# Patient Record
Sex: FEMALE
[De-identification: names, ages, dates, MRNs, and addresses within clinical notes are randomized per-mention and may not be internally consistent; named-entity substitution may affect disease eponyms.]

---

## 2018-05-05 ENCOUNTER — HOSPITAL ENCOUNTER (EMERGENCY)
Dept: HOSPITAL 97 - ER | Age: 23
Discharge: HOME | End: 2018-05-05
Payer: SELF-PAY

## 2018-05-05 DIAGNOSIS — O03.9: Primary | ICD-10-CM

## 2018-05-05 LAB
BLD SMEAR INTERP: (no result)
BUN BLD-MCNC: 9 MG/DL (ref 6–20)
GLUCOSE SERPLBLD-MCNC: 100 MG/DL (ref 65–120)
HCG SERPL-ACNC: 1188.7 MIU/ML (ref ?–5)
HCT VFR BLD CALC: 32.7 % (ref 36–45)
LYMPHOCYTES # SPEC AUTO: 2 K/UL (ref 0.7–4.9)
MCH RBC QN AUTO: 28 PG (ref 27–35)
MCV RBC: 81.5 FL (ref 80–100)
MORPHOLOGY BLD-IMP: (no result)
PMV BLD: 8.2 FL (ref 7.6–11.3)
POTASSIUM SERPL-SCNC: 3.8 MEQ/L (ref 3.6–5)
RBC # BLD: 4.02 M/UL (ref 3.86–4.86)
UA COMPLETE W REFLEX CULTURE PNL UR: (no result)

## 2018-05-05 PROCEDURE — 87086 URINE CULTURE/COLONY COUNT: CPT

## 2018-05-05 PROCEDURE — 81015 MICROSCOPIC EXAM OF URINE: CPT

## 2018-05-05 PROCEDURE — 81025 URINE PREGNANCY TEST: CPT

## 2018-05-05 PROCEDURE — 96361 HYDRATE IV INFUSION ADD-ON: CPT

## 2018-05-05 PROCEDURE — 99284 EMERGENCY DEPT VISIT MOD MDM: CPT

## 2018-05-05 PROCEDURE — 86901 BLOOD TYPING SEROLOGIC RH(D): CPT

## 2018-05-05 PROCEDURE — 36415 COLL VENOUS BLD VENIPUNCTURE: CPT

## 2018-05-05 PROCEDURE — 84702 CHORIONIC GONADOTROPIN TEST: CPT

## 2018-05-05 PROCEDURE — 96372 THER/PROPH/DIAG INJ SC/IM: CPT

## 2018-05-05 PROCEDURE — 80048 BASIC METABOLIC PNL TOTAL CA: CPT

## 2018-05-05 PROCEDURE — 86900 BLOOD TYPING SEROLOGIC ABO: CPT

## 2018-05-05 PROCEDURE — 85025 COMPLETE CBC W/AUTO DIFF WBC: CPT

## 2018-05-05 PROCEDURE — 76817 TRANSVAGINAL US OBSTETRIC: CPT

## 2018-05-05 PROCEDURE — 96374 THER/PROPH/DIAG INJ IV PUSH: CPT

## 2018-05-05 PROCEDURE — 87088 URINE BACTERIA CULTURE: CPT

## 2018-05-05 PROCEDURE — 81003 URINALYSIS AUTO W/O SCOPE: CPT

## 2018-05-05 NOTE — ER
Nurse's Notes                                                                                     

 [facility]                                                                

Name: Georgia Brothers                                                                            

Age: 23 yrs                                                                                       

Sex: Female                                                                                       

: 1995                                                                                   

MRN: [id number]                                                                                   

Arrival Date: 2018                                                                          

Time: 18:49                                                                                       

Account#: [id number]                                                                            

Bed 23                                                                                            

Private MD:                                                                                       

Diagnosis: Spontaneous                                                                    

                                                                                                  

Presentation:                                                                                     

                                                                                             

19:03 Presenting complaint: Patient states: vaginal bleeding started today. pt seen by [facility]    [facility] 

      Ob/GYN yesterday confirming miscarriage. LMP 2018. Transition of care: patient was       

      not received from another setting of care. Onset of symptoms was May 05, 2018. Initial      

      Sepsis Screen: Does the patient meet any 2 criteria? No. Patient's initial sepsis           

      screen is negative. Does the patient have a suspected source of infection? No.              

      Patient's initial sepsis screen is negative. Care prior to arrival: None.                   

19:03 Method Of Arrival: Ambulatory                                                           [facility] 

19:03 Acuity: EMMY 4                                                                           [facility] 

                                                                                                  

Triage Assessment:                                                                                

19:43 General: Appears in no apparent distress. Behavior is calm, cooperative. Pain:          ak1 

      Complains of pain in pelvis. EENT: No signs and/or symptoms were reported regarding the     

      EENT system. Neuro: No deficits noted. Cardiovascular: No deficits noted. Respiratory:      

      Reports shortness of breath due to abd/pelvic pain from cramps. GI: No signs and/or         

      symptoms were reported involving the gastrointestinal system. : Reports vaginal           

      bleeding that is bright red, moderate flow, since today. Derm: No signs and/or symptoms     

      reported regarding the dermatologic system. Musculoskeletal: No signs and/or symptoms       

      reported regarding the musculoskeletal system.                                              

                                                                                                  

OB/GYN:                                                                                           

19:42 LMP 2018                                                                              [facility] 

19:42 pt stated she is pregnant with no heart beat found yesterday in her OB/GYN office in    [address]                                                                                

21:23  3, Full Term 2, Living 2, LMP 2018                                          snw 

                                                                                                  

Historical:                                                                                       

- Allergies:                                                                                      

19:04 No Known Allergies;                                                                     [facility] 

- Home Meds:                                                                                      

19:04 None [Active];                                                                          ak 

- PMHx:                                                                                           

19:04 None;                                                                                   ak 

- PSHx:                                                                                           

19:04 None;                                                                                   [facility] 

                                                                                                  

- Immunization history:: Adult Immunizations unknown.                                             

- Social history:: Smoking status: Patient/guardian denies using tobacco.                         

                                                                                                  

                                                                                                  

Screenin:43 Abuse screen: Denies threats or abuse. Denies injuries from another. Nutritional        ak1 

      screening: No deficits noted. Tuberculosis screening: No symptoms or risk factors           

      identified. Fall Risk None identified.                                                      

                                                                                                  

Assessment:                                                                                       

21:00 General: Appears in no apparent distress. uncomfortable, well groomed, well developed,  kr2 

      well nourished, Behavior is calm, cooperative. Pain: Complains of pain in pelvis Pain       

      does not radiate. Pain currently is 8 out of 10 on a pain scale. Quality of pain is         

      described as crampy, squeezing, Is continuous. Neuro: Level of Consciousness is awake,      

      alert, obeys commands, Oriented to person, place, time, situation. Cardiovascular:          

      Capillary refill < 3 seconds in bilateral fingers Patient's skin is warm and dry.           

      Respiratory: Airway is patent Respiratory effort is even, unlabored, Respiratory            

      pattern is regular, symmetrical. GI: Abdomen is flat, non-distended, Bowel sounds           

      present X 4 quads. : Urine is blood tinged, Vaginal discharge is bloody. : Reports      

      vaginal bleeding that is bright red, heavy flow. EENT: Nares are clear bilaterally Oral     

      mucosa is moist. Derm: Skin is intact, is healthy with good turgor, Skin is pink, warm      

      \T\ dry. Musculoskeletal: Circulation, motion, and sensation intact.                        

22:00 Reassessment: Patient appears in no apparent distress at this time. Patient and/or      kr2 

      family updated on plan of care and expected duration. Pain level reassessed. Patient is     

      alert, oriented x 3, equal unlabored respirations, skin warm/dry/pink.                      

23:00 Reassessment: Patient appears in no apparent distress at this time. Patient and/or      kr2 

      family updated on plan of care and expected duration. Pain level reassessed. Patient is     

      alert, oriented x 3, equal unlabored respirations, skin warm/dry/pink. Patient states       

      symptoms have not improved.                                                                 

                                                                                                  

Vital Signs:                                                                                      

19:04  / 64; Pulse 79; Resp 18; Temp 97.8; Pulse Ox 100% on R/A; Weight 68.04 kg (R);   ak1 

      Height 5 ft. 4 in. (162.56 cm) (R); Pain 10/10;                                             

22:30  / 69; Pulse 60; Resp 15; Pulse Ox 100% on R/A;                                   kr2 

23:24  / 82; Pulse 64; Resp 15; Pulse Ox 100% on R/A;                                   kr2 

19:04 Body Mass Index 25.75 (68.04 kg, 162.56 cm)                                             ak1 

                                                                                                  

ED Course:                                                                                        

18:49 Patient arrived in ED.                                                                  sb2 

18:51 Ama Carrasco FNP-C is PHCP.                                                        kb  

18:51 Fareed May MD is Attending Physician.                                                kb  

19:04 Triage completed.                                                                       ak1 

19:04 Arm band placed on Patient placed in waiting room, Patient notified of wait time.       ak1 

19:44 Patient has correct armband on for positive identification.                             ak1 

19:45 Urine collected: clean catch specimen, blood tinged.                                    ak1 

20:48 Torie Nuñez FNP-C is PHCP.                                                        snw 

20:48 Mandeep Hanks MD is Attending Physician.                                                    snw 

20:51 Teresa Mcconnell, RN is Primary Nurse.                                                     kr2 

21:20 Inserted saline lock: 22 gauge in right antecubital area, using aseptic technique.      kr2 

      Blood collected.                                                                            

21:21 Radiology exam delayed due to pregnancy test not completed at this time. HCG.           sg3 

22:38 Ultrasound completed. Patient tolerated poorly. Notified NP/BASSAM ramos.                  sg3 

22:51 US Transvaginal Ob In Process Unspecified.                                              EDMS

23:32 No provider procedures requiring assistance completed. IV discontinued, intact,         kr2 

      bleeding controlled, No redness/swelling at site. Pressure dressing applied.                

                                                                                                  

Administered Medications:                                                                         

21:34 Drug: NS 0.9% 1000 ml Route: IV; Rate: 1000 ml; Site: right antecubital;                kr2 

22:30 Follow up: Response: No adverse reaction; IV Status: Completed infusion                 kr2 

22:50 Drug: TORadol 30 mg Route: IVP; Site: right antecubital;                                kr2 

23:28 Follow up: Response: No adverse reaction; Pain is unchanged, physician notified         kr2 

23:02 Drug: NS 0.9% 1000 ml Route: IV; Rate: 1 bolus; Site: right antecubital;                kr2 

23:43 Follow up: Response: No adverse reaction; IV Status: Completed infusion                 kr2 

23:02 Not Given (PO form not available): METHERgine 0.2 mg PO once                            kr2 

23:03 Drug: METHERgine 0.2 mg Route: IM; Site: right deltoid;                                 kr2 

23:28 Follow up: Response: No adverse reaction                                                kr2 

23:27 Drug: Norco (7.5 mg-325 mg) 1 tabs Route: PO;                                           kr2 

23:36 Follow up: Response: Medication administered at discharge.                              kr2 

                                                                                                  

                                                                                                  

Outcome:                                                                                          

22:40 Discharge ordered by MD.                                                                snw 

23:32 Discharged to home via wheelchair, with family.                                         kr2 

23:32 Condition: good                                                                             

23:32 Discharge instructions given to patient, Instructed on discharge instructions, follow       

      up and referral plans. medication usage, Demonstrated understanding of instructions,        

      follow-up care, medications, Prescriptions given X 1.                                       

23:44 Patient left the ED.                                                                    kr2 

                                                                                                  

Signatures:                                                                                       

Dispatcher [employer]                           EDMS                                                 

Ama Carrasco, FNP-C                 FNP-Ckb                                                   

Torie Nuñez, FNP-C                 FNP-Csnw                                                  

Miri Landa RN                       RN   ak1                                                  

Teresa Mcconnell RN                       RN   kr2                                                  

Aarti Blandon Sheri sb2                                                  

                                                                                                  

**************************************************************************************************

## 2018-05-05 NOTE — EDPHYS
Physician Documentation                                                                           

 [facility]                                                                

Name: Georgia Brothers                                                                            

Age: 23 yrs                                                                                       

Sex: Female                                                                                       

: 1995                                                                                   

MRN: [id number]                                                                                   

Arrival Date: 2018                                                                          

Time: 18:49                                                                                       

Account#: [id number]                                                                            

Bed 23                                                                                            

Private MD:                                                                                       

ED Physician Mandeep Hanks                                                                             

HPI:                                                                                              

                                                                                             

21:23 This 23 yrs old  Female presents to ER via Ambulatory with complaints of        snw 

      Vaginal Bleeding, Abdominal Pain.                                                           

21:23 The patient presents with vaginal bleeding that is light. Onset: The symptoms/episode   snw 

      began/occurred suddenly, yesterday. Modifying factors: The symptoms are alleviated by       

      nothing. Severity of symptoms: At their worst the symptoms were moderate. The patient       

      has not experienced similar symptoms in the past. The patient has been recently seen by     

      a physician: the patient's primary care provider, an OB/Gyn specialist, with similar        

      presenting complaints, and apparently given a diagnosis of inevitable ab.                   

                                                                                                  

OB/GYN:                                                                                           

19:42 LMP 2018                                                                              [facility] 

19:42 pt stated she is pregnant with no heart beat found yesterday in her OB/GYN office in    [address].                                                                                

21:23  3, Full Term 2, Living 2, LMP 2018                                          snw 

                                                                                                  

Historical:                                                                                       

- Allergies:                                                                                      

19:04 No Known Allergies;                                                                     [facility] 

- Home Meds:                                                                                      

19:04 None [Active];                                                                          [facility] 

- PMHx:                                                                                           

19:04 None;                                                                                   [facility] 

- PSHx:                                                                                           

19:04 None;                                                                                   [facility] 

                                                                                                  

- Immunization history:: Adult Immunizations unknown.                                             

- Social history:: Smoking status: Patient/guardian denies using tobacco.                         

                                                                                                  

                                                                                                  

ROS:                                                                                              

21:22 Constitutional: Negative for fever, chills, and weight loss, Eyes: Negative for injury, snw 

      pain, redness, and discharge, ENT: Negative for injury, pain, and discharge, Neck:          

      Negative for injury, pain, and swelling, Cardiovascular: Negative for chest pain,           

      palpitations, and edema, Abdomen/GI: Negative for abdominal pain, nausea, vomiting,         

      diarrhea, and constipation, Back: Negative for injury and pain, MS/Extremity: Negative      

      for injury and deformity, Skin: Negative for injury, rash, and discoloration, Neuro:        

      Negative for headache, weakness, numbness, tingling, and seizure.                           

21:22 Respiratory: Positive for shortness of breath.                                              

21:22 : Positive for vaginal bleeding, inevitable ab per OB yesterday.                          

                                                                                                  

Exam:                                                                                             

21:21 Constitutional:  This is a well developed, well nourished patient who is awake, alert,  snw 

      and in no acute distress. Head/Face:  Normocephalic, atraumatic. Eyes:  Pupils equal        

      round and reactive to light, extra-ocular motions intact.  Lids and lashes normal.          

      Conjunctiva and sclera are non-icteric and not injected.  Cornea within normal limits.      

      Periorbital areas with no swelling, redness, or edema. ENT:  Nares patent. No nasal         

      discharge, no septal abnormalities noted.  Tympanic membranes are normal and external       

      auditory canals are clear.  Oropharynx with no redness, swelling, or masses, exudates,      

      or evidence of obstruction, uvula midline.  Mucous membranes moist. Neck:  Trachea          

      midline, no thyromegaly or masses palpated, and no cervical lymphadenopathy.  Supple,       

      full range of motion without nuchal rigidity, or vertebral point tenderness.  No            

      Meningismus. Chest/axilla:  Normal chest wall appearance and motion.  Nontender with no     

      deformity.  No lesions are appreciated. Cardiovascular:  Regular rate and rhythm with a     

      normal S1 and S2.  No gallops, murmurs, or rubs.  Normal PMI, no JVD.  No pulse             

      deficits. Respiratory:  Lungs have equal breath sounds bilaterally, clear to                

      auscultation and percussion.  No rales, rhonchi or wheezes noted.  No increased work of     

      breathing, no retractions or nasal flaring. Abdomen/GI:  Soft, non-tender, with normal      

      bowel sounds.  No distension or tympany.  No guarding or rebound.  No evidence of           

      tenderness throughout. Back:  No spinal tenderness.  No costovertebral tenderness.          

      Full range of motion. Pelvic Exam:  Normal external genitalia. Mild bleeding noted.         

      Awaiting US Skin:  Warm, dry with normal turgor.  Normal color with no rashes, no           

      lesions, and no evidence of cellulitis. MS/ Extremity:  Pulses equal, no cyanosis.          

      Neurovascular intact.  Full, normal range of motion. Neuro:  Awake and alert, GCS 15,       

      oriented to person, place, time, and situation.  Cranial nerves II-XII grossly intact.      

      Motor strength 5/5 in all extremities.  Sensory grossly intact.  Cerebellar exam            

      normal.  Normal gait.                                                                       

                                                                                                  

Vital Signs:                                                                                      

19:04  / 64; Pulse 79; Resp 18; Temp 97.8; Pulse Ox 100% on R/A; Weight 68.04 kg (R);   ak1 

      Height 5 ft. 4 in. (162.56 cm) (R); Pain 10/10;                                             

22:30  / 69; Pulse 60; Resp 15; Pulse Ox 100% on R/A;                                   kr2 

23:24  / 82; Pulse 64; Resp 15; Pulse Ox 100% on R/A;                                   kr2 

19:04 Body Mass Index 25.75 (68.04 kg, 162.56 cm)                                             ak1 

                                                                                                  

MDM:                                                                                              

20:51 Patient medically screened.                                                             snw 

22:41 Data reviewed: vital signs, nurses notes. Data interpreted: Pulse oximetry: on room air snw 

      is 100 %. Interpretation: normal. Counseling: I had a detailed discussion with the          

      patient and/or guardian regarding: the historical points, exam findings, and any            

      diagnostic results supporting the discharge/admit diagnosis, lab results, radiology         

      results, the need for outpatient follow up, to return to the emergency department if        

      symptoms worsen or persist or if there are any questions or concerns that arise at          

      home. Special discussion: Based on the history and exam findings, there is no               

      indication for further emergent testing or inpatient evaluation. I discussed with the       

      patient/guardian the need to see the OB Gyne specialist for further evaluation of the       

      symptoms.                                                                                   

                                                                                                  

                                                                                             

19:25 Order name: Urine Microscopic Only; Complete Time: 20:48                                kb  

                                                                                             

19:27 Order name: Urine Dipstick--Ancillary (enter results); Complete Time: 20:48             em1 

                                                                                             

19:27 Order name: Urine Pregnancy--Ancillary (enter results); Complete Time: 20:48            1 

                                                                                             

19:59 Order name: Urine Culture                                                               [facility]

                                                                                             

21:11 Order name: Quantitative Hcg; Complete Time: 22:13                                      snw 

                                                                                             

21:11 Order name: Abo/rh Typing; Complete Time: 21:57                                         snw 

                                                                                             

20:58 Order name: US Transvaginal Ob                                                          snw 

                                                                                             

21:11 Order name: Basic Metabolic Panel; Complete Time: 22:13                                 snw 

                                                                                             

21:11 Order name: CBC with Diff; Complete Time: 22:13                                         snw 

                                                                                             

21:58 Order name: CBC Smear Scan; Complete Time: 22:13                                        EDMS

                                                                                             

19:10 Order name: Urine Pregnancy Test (obtain specimen); Complete Time: 19:26                kb  

                                                                                             

19:10 Order name: Urine Dipstick-Ancillary (obtain specimen); Complete Time: 19:26            kb  

                                                                                             

21:11 Order name: IV Saline Lock; Complete Time: 21:23                                        snw 

                                                                                             

21:11 Order name: Labs collected and sent; Complete Time: 21:23                               snw 

                                                                                             

21:11 Order name: NPO; Complete Time: 21:23                                                   snw 

                                                                                                  

Administered Medications:                                                                         

21:34 Drug: NS 0.9% 1000 ml Route: IV; Rate: 1000 ml; Site: right antecubital;                kr2 

22:30 Follow up: Response: No adverse reaction; IV Status: Completed infusion                 kr2 

22:50 Drug: TORadol 30 mg Route: IVP; Site: right antecubital;                                kr2 

23:28 Follow up: Response: No adverse reaction; Pain is unchanged, physician notified         kr2 

23:02 Drug: NS 0.9% 1000 ml Route: IV; Rate: 1 bolus; Site: right antecubital;                kr2 

23:43 Follow up: Response: No adverse reaction; IV Status: Completed infusion                 kr2 

23:02 Not Given (PO form not available): METHERgine 0.2 mg PO once                            kr2 

23:03 Drug: METHERgine 0.2 mg Route: IM; Site: right deltoid;                                 kr2 

23:28 Follow up: Response: No adverse reaction                                                kr2 

23:27 Drug: Norco (7.5 mg-325 mg) 1 tabs Route: PO;                                           kr2 

23:36 Follow up: Response: Medication administered at discharge.                              kr2 

                                                                                                  

                                                                                                  

Disposition:                                                                                      

                                                                                             

01:37 Co-signature as Attending Physician, Mandeep Hanks MD.                                        pkl 

                                                                                                  

Disposition:                                                                                      

18 22:40 Discharged to Home. Impression: Spontaneous .                              

- Condition is Stable.                                                                            

- Discharge Instructions: Miscarriage.                                                            

- Prescriptions for Prenatal Vitamin 27- 0.8 mg Oral Tablet - take 1 tablet by ORAL               

  route once daily; 30 tablet.                                                                    

- Medication Reconciliation Form, Thank You Letter, Antibiotic Education, Prescription            

  Opioid Use form.                                                                                

- Follow up: Private Physician; When: 2 - 3 days; Reason: Recheck today's complaints,             

  Continuance of care, Re-evaluation by your physician. Follow up: Emergency                      

  Department; When: As needed; Reason: Worsening of condition.                                    

                                                                                                  

                                                                                                  

                                                                                                  

Signatures:                                                                                       

Dispatcher [employer]                           JOSE Carrasco Ama, SHANTAP-C                 JA-CkMandeep Beasley MD MD pkl Therrien, Shelly, SHANTAP-C                 SHANTAP-Miri Santiago, RN                       RN   ak1                                                  

Teresa Mcconnell RN                       RN   kr2                                                  

                                                                                                  

Corrections: (The following items were deleted from the chart)                                    

                                                                                             

22:52 20:50 OB [facility]US.RAD.BRZ ordered. [facility]                                               EDMS

23:44 22:40 2018 22:40 Discharged to Home. Impression: Spontaneous . Condition  kr2 

      is Stable. Forms are Medication Reconciliation Form, Thank You Letter, Antibiotic           

      Education, Prescription Opioid Use. Follow up: Private Physician; When: 2 - 3 days;         

      Reason: Recheck today's complaints, Continuance of care, Re-evaluation by your              

      physician. Follow up: Emergency Department; When: As needed; Reason: Worsening of           

      condition. snw                                                                              

                                                                                                  

**************************************************************************************************

## 2018-05-06 NOTE — RAD REPORT
EXAM DESCRIPTION:  US - Transvaginal OB - 2018 10:51 pm

 

CLINICAL HISTORY:  Vaginal bleeding, pelvic pain elevated HCG.

 

COMPARISON:  None.

 

FINDINGS:  The uterus appears enlarged measuring 12.1 x 7.8 x 6.0 cm. The endometrium is heterogenous
 and thickened measuring 2.5 cm. There is no evidence of gestational sac. Per clinical history, the p
atient had a sonogram elsewhere yesterday which showed gestational sac. Thus, the findings may be rel
ated to recent spontaneous . This should be clinically correlated.

 

Neither ovary was well seen. The patient was in significant pain limiting evaluation of the ovaries a
nd adnexa.

 

IMPRESSION:  A recent spontaneous  is suspected, however clinical correlation is advised with
 results of the outside ultrasound done yesterday.Continued follow-up HCG levels until normalization 
is recommended. Follow-up ultrasound may also be of value in 7-10 days.